# Patient Record
Sex: FEMALE | URBAN - METROPOLITAN AREA
[De-identification: names, ages, dates, MRNs, and addresses within clinical notes are randomized per-mention and may not be internally consistent; named-entity substitution may affect disease eponyms.]

---

## 2021-10-07 ENCOUNTER — NURSE TRIAGE (OUTPATIENT)
Dept: NURSING | Facility: CLINIC | Age: 21
End: 2021-10-07

## 2021-10-07 NOTE — TELEPHONE ENCOUNTER
"    Reason for Disposition    [1] Sore throat is the only symptom AND [2] present > 48 hours    Additional Information    Negative: Severe difficulty breathing (e.g., struggling for each breath, speaks in single words, stridor)    Negative: Sounds like a life-threatening emergency to the triager    Negative: [1] Drooling or spitting out saliva (because can't swallow) AND [2] normal breathing    Negative: Unable to open mouth completely    Negative: [1] Difficulty breathing AND [2] not severe    Negative: Fever > 104 F (40 C)    Negative: [1] Refuses to drink anything AND [2] for > 12 hours    Negative: [1] Drinking very little AND [2] dehydration suspected (e.g., no urine > 12 hours, very dry mouth, very lightheaded)    Negative: Patient sounds very sick or weak to the triager    Negative: SEVERE (e.g., excruciating) throat pain    Negative: [1] Pus on tonsils (back of throat) AND [2]  fever AND [3] swollen neck lymph nodes (\"glands\")    Negative: [1] Rash AND [2] widespread (especially chest and abdomen)    Negative: Earache also present    Negative: Fever present > 3 days (72 hours)    Negative: Diabetes mellitus or weak immune system (e.g., HIV positive, cancer chemo, splenectomy, organ transplant, chronic steroids)    Negative: History of rheumatic fever    Negative: [1] Adult is leaving on a trip AND [2] requests an antibiotic NOW    Negative: [1] Positive throat culture or rapid strep test (according to lab, PCP, caller, etc.) AND [2] NO  standing order to call in prescription for antibiotic    Negative: [1] Exposure to family member (or spouse or boyfriend/girlfriend) with test-proven strep AND [2] within last 10 days    Protocols used: SORE THROAT-A-AH    \"I started getting a dry cough last Grant 10/3, I've hd that all week. Today I also have a sore throat(rates pain a 2/10) but I tried to look at the back of my throat. There might be smalldots, I can't tell. No fever, but my voice has also been hoarse and " "I have a mild runny nose.\"  Denies other sx   Would like to be tested  Triaged, gave home care advice and advised to call [facility] in am 10/8 for appt   Call back if needed  Karli Blakely RN [facility]        "